# Patient Record
Sex: MALE | Race: WHITE | NOT HISPANIC OR LATINO | URBAN - NONMETROPOLITAN AREA
[De-identification: names, ages, dates, MRNs, and addresses within clinical notes are randomized per-mention and may not be internally consistent; named-entity substitution may affect disease eponyms.]

---

## 2019-05-09 ENCOUNTER — IMPORTED ENCOUNTER (OUTPATIENT)
Dept: URBAN - NONMETROPOLITAN AREA CLINIC 1 | Facility: CLINIC | Age: 69
End: 2019-05-09

## 2019-05-09 PROBLEM — H25.13: Noted: 2019-05-09

## 2019-05-09 PROBLEM — H35.3131: Noted: 2019-05-09

## 2019-05-09 PROBLEM — H52.13: Noted: 2019-05-09

## 2019-05-09 PROBLEM — H02.831: Noted: 2019-05-09

## 2019-05-09 PROBLEM — H02.834: Noted: 2019-05-09

## 2019-05-09 PROBLEM — H02.403: Noted: 2019-05-09

## 2019-05-09 PROBLEM — H52.223: Noted: 2019-05-09

## 2019-05-09 PROCEDURE — 92014 COMPRE OPH EXAM EST PT 1/>: CPT

## 2019-05-09 NOTE — PATIENT DISCUSSION
Ptosis OU. Minimal cataracts OU. Retinal drusen OU.; Dr's Notes: Enjoys doing metal lathe work. Enjoys fishing for [employer]. Getting new knees thru [address] Cousin is Elena To (and Leticia Durham.

## 2020-06-10 ENCOUNTER — IMPORTED ENCOUNTER (OUTPATIENT)
Dept: URBAN - NONMETROPOLITAN AREA CLINIC 1 | Facility: CLINIC | Age: 70
End: 2020-06-10

## 2020-06-10 PROCEDURE — 92014 COMPRE OPH EXAM EST PT 1/>: CPT

## 2020-06-10 NOTE — PATIENT DISCUSSION
AMD - dry-Explained dry AMD and advised that there are no treatments available at this time.-Continue AREDS 2 MVT. -Continue [employer] grid monitoring daily. Pt is to contact our office if any changes are noted. Cataract OU-Not yet surgical. -Reviewed symptoms of advancing cataract growth such as glare and halos and decreased vision.-Continue to monitor for now. Pt will notify us if any new symptoms develop. 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 Enjoys fishing for [employer]. Getting new knees thru [address]. Cousin is Gene Lam (and Shaylee Durham.

## 2021-06-11 ENCOUNTER — IMPORTED ENCOUNTER (OUTPATIENT)
Dept: URBAN - NONMETROPOLITAN AREA CLINIC 1 | Facility: CLINIC | Age: 71
End: 2021-06-11

## 2021-06-11 PROCEDURE — 92014 COMPRE OPH EXAM EST PT 1/>: CPT

## 2021-06-11 NOTE — PATIENT DISCUSSION
AMD - dry-Explained dry AMD and advised that there are no treatments available at this time.-Continue AREDS 2 MVT. -Continue [employer] grid monitoring daily. Pt is to contact our office if any changes are noted. Cataract OU-Not yet surgical. -Reviewed symptoms of advancing cataract growth such as glare and halos and decreased vision.-Continue to monitor for now. Pt will notify us if any new symptoms develop. 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 Enjoys fishing for [employer]. Getting new knees thru [address]. Cousin is Joaquín Baum (and Beckie Durham.

## 2022-04-09 ASSESSMENT — TONOMETRY
OD_IOP_MMHG: 16
OS_IOP_MMHG: 16
OS_IOP_MMHG: 14
OS_IOP_MMHG: 14
OD_IOP_MMHG: 14
OD_IOP_MMHG: 14

## 2022-04-09 ASSESSMENT — VISUAL ACUITY
OS_SC: 20/20
OD_SC: 20/40
OD_SC: 20/20
OS_SC: 20/40
OS_SC: 20/30-2 BLURRY
OU_CC: J1
OD_SC: 20/25